# Patient Record
Sex: MALE | Race: WHITE | NOT HISPANIC OR LATINO | URBAN - METROPOLITAN AREA
[De-identification: names, ages, dates, MRNs, and addresses within clinical notes are randomized per-mention and may not be internally consistent; named-entity substitution may affect disease eponyms.]

---

## 2020-01-17 ENCOUNTER — TRANSCRIBE ORDERS (OUTPATIENT)
Dept: LAB | Facility: HOSPITAL | Age: 20
End: 2020-01-17

## 2020-01-17 ENCOUNTER — APPOINTMENT (OUTPATIENT)
Dept: LAB | Facility: HOSPITAL | Age: 20
End: 2020-01-17

## 2020-01-17 DIAGNOSIS — Z11.1 SCREENING EXAMINATION FOR PULMONARY TUBERCULOSIS: Primary | ICD-10-CM

## 2020-01-17 DIAGNOSIS — Z11.1 SCREENING EXAMINATION FOR PULMONARY TUBERCULOSIS: ICD-10-CM

## 2020-01-17 PROCEDURE — 86480 TB TEST CELL IMMUN MEASURE: CPT

## 2020-01-17 PROCEDURE — 36415 COLL VENOUS BLD VENIPUNCTURE: CPT

## 2020-01-20 LAB
GAMMA INTERFERON BACKGROUND BLD IA-ACNC: 0.02 IU/ML
M TB IFN-G BLD-IMP: NEGATIVE
M TB IFN-G CD4+ BCKGRND COR BLD-ACNC: 0 IU/ML
M TB IFN-G CD4+ BCKGRND COR BLD-ACNC: 0.01 IU/ML
MITOGEN IGNF BCKGRD COR BLD-ACNC: >10 IU/ML

## 2021-08-23 ENCOUNTER — OFFICE VISIT (OUTPATIENT)
Dept: URGENT CARE | Facility: CLINIC | Age: 21
End: 2021-08-23
Payer: COMMERCIAL

## 2021-08-23 ENCOUNTER — TELEPHONE (OUTPATIENT)
Dept: UROLOGY | Facility: MEDICAL CENTER | Age: 21
End: 2021-08-23

## 2021-08-23 VITALS
HEART RATE: 76 BPM | SYSTOLIC BLOOD PRESSURE: 121 MMHG | TEMPERATURE: 98.8 F | DIASTOLIC BLOOD PRESSURE: 76 MMHG | RESPIRATION RATE: 17 BRPM | OXYGEN SATURATION: 98 %

## 2021-08-23 DIAGNOSIS — N39.0 URINARY TRACT INFECTION WITHOUT HEMATURIA, SITE UNSPECIFIED: Primary | ICD-10-CM

## 2021-08-23 LAB
SL AMB  POCT GLUCOSE, UA: ABNORMAL
SL AMB LEUKOCYTE ESTERASE,UA: ABNORMAL
SL AMB POCT BILIRUBIN,UA: ABNORMAL
SL AMB POCT BLOOD,UA: ABNORMAL
SL AMB POCT CLARITY,UA: ABNORMAL
SL AMB POCT COLOR,UA: ABNORMAL
SL AMB POCT KETONES,UA: 40
SL AMB POCT NITRITE,UA: POSITIVE
SL AMB POCT PH,UA: 8.5
SL AMB POCT SPECIFIC GRAVITY,UA: 1.01
SL AMB POCT URINE PROTEIN: 30
SL AMB POCT UROBILINOGEN: 2

## 2021-08-23 PROCEDURE — 99213 OFFICE O/P EST LOW 20 MIN: CPT | Performed by: NURSE PRACTITIONER

## 2021-08-23 PROCEDURE — 81002 URINALYSIS NONAUTO W/O SCOPE: CPT | Performed by: NURSE PRACTITIONER

## 2021-08-23 PROCEDURE — 87186 SC STD MICRODIL/AGAR DIL: CPT | Performed by: NURSE PRACTITIONER

## 2021-08-23 PROCEDURE — 87077 CULTURE AEROBIC IDENTIFY: CPT | Performed by: NURSE PRACTITIONER

## 2021-08-23 PROCEDURE — 87086 URINE CULTURE/COLONY COUNT: CPT | Performed by: NURSE PRACTITIONER

## 2021-08-23 RX ORDER — FLUTICASONE PROPIONATE 50 MCG
2 SPRAY, SUSPENSION (ML) NASAL DAILY
COMMUNITY

## 2021-08-23 RX ORDER — CEPHALEXIN 500 MG/1
500 CAPSULE ORAL EVERY 6 HOURS SCHEDULED
Qty: 28 CAPSULE | Refills: 0 | Status: SHIPPED | OUTPATIENT
Start: 2021-08-23 | End: 2021-08-30

## 2021-08-23 RX ORDER — ASCORBIC ACID 500 MG
500 TABLET ORAL DAILY
COMMUNITY

## 2021-08-23 RX ORDER — OXYBUTYNIN CHLORIDE 15 MG/1
TABLET, EXTENDED RELEASE ORAL
COMMUNITY
Start: 2021-05-19 | End: 2021-10-15

## 2021-08-23 NOTE — TELEPHONE ENCOUNTER
Called and spoke with patients mother Kaylah Schultz  Scheduled patient for a new patient appointment in the [facility] on 9/24/2021 with Sandra Davis  Aware of office location

## 2021-08-23 NOTE — TELEPHONE ENCOUNTER
Please Triage - [place]   New Patient-     What is the reason for the patients appointment? Patient's mom called stating patient has a neurogenic bladder and he was treated at [facility] in [place]  Patient is now 21 and not able to see them anymore  She would like to have him follow up with [facility]        Imaging/Lab Results: all records in epic       Do we accept the patient's insurance or is the patient Self-Pay? [facility]   Provider & Plan:   Member ID#: Has the patient had any previous urologist(s)? Rufino Desai In [place]       Have patient records been requested?in epic        Has the patient had any outside testing done?       Does the patient have a personal history of cancer?no       Patient can be reached at :[phone] or   [phone]

## 2021-08-23 NOTE — PATIENT INSTRUCTIONS
Urinary Tract Infection in Men   WHAT YOU NEED TO KNOW:   A urinary tract infection (UTI) is caused by bacteria that get inside your urinary tract  Most bacteria that enter your urinary tract come out when you urinate  If the bacteria stay in your urinary tract, you may get an infection  Your urinary tract includes your kidneys, ureters, bladder, and urethra  Urine is made in your kidneys, and it flows from the ureters to the bladder  Urine leaves the bladder through the urethra  A UTI is more common in your lower urinary tract, which includes your bladder and urethra  DISCHARGE INSTRUCTIONS:   Return to the emergency department if:   · You are urinating very little or not at all  · You have a high fever with shaking chills  · You have side or back pain that gets worse  Contact your healthcare provider if:   · You have a mild fever  · You do not feel better after 2 days of taking antibiotics  · You are vomiting  · You have new symptoms, such as blood or pus in your urine  · You have questions or concerns about your condition or care  Medicines:   · Antibiotics  help fight a bacterial infection  · Medicines  may be given to decrease pain and burning when you urinate  They will also help decrease the feeling that you need to urinate often  These medicines will make your urine orange or red  · Take your medicine as directed  Contact your healthcare provider if you think your medicine is not helping or if you have side effects  Tell him of her if you are allergic to any medicine  Keep a list of the medicines, vitamins, and herbs you take  Include the amounts, and when and why you take them  Bring the list or the pill bottles to follow-up visits  Carry your medicine list with you in case of an emergency  Prevent another UTI:   · Empty your bladder often  Urinate and empty your bladder as soon as you feel the need  Do not hold your urine for long periods of time      · Drink liquids as directed  Ask how much liquid to drink each day and which liquids are best for you  You may need to drink more liquids than usual to help flush out the bacteria  Do not drink alcohol, caffeine, or citrus juices  These can irritate your bladder and increase your symptoms  Your healthcare provider may recommend cranberry juice to help prevent a UTI  · Urinate after you have sex  This can help flush out bacteria passed during sex  · Do pelvic muscle exercises often  Pelvic muscle exercises may help you start and stop urinating  Strong pelvic muscles may help you empty your bladder easier  Squeeze these muscles tightly for 5 seconds like you are trying to hold back urine  Then relax for 5 seconds  Gradually work up to squeezing for 10 seconds  Do 3 sets of 15 repetitions a day, or as directed  Follow up with your healthcare provider as directed:  Write down your questions so you remember to ask them during your visits  © [employer] 2021 Information is for End User's use only and may not be sold, redistributed or otherwise used for commercial purposes  All illustrations and images included in CareNotes® are the copyrighted property of A D A M , Inc  or [facility] Monica Zaldivar   The above information is an  only  It is not intended as medical advice for individual conditions or treatments  Talk to your doctor, nurse or pharmacist before following any medical regimen to see if it is safe and effective for you

## 2021-08-23 NOTE — PROGRESS NOTES
[address]        NAME: Jhoana Ignacio is a 21 y o  male  : 2000    MRN: [id number]  DATE: 2021  TIME: 6:49 PM    Assessment and Plan   Urinary tract infection without hematuria, site unspecified [N39 0]  1  Urinary tract infection without hematuria, site unspecified  POCT urine dip    Urine culture    cephalexin (KEFLEX) 500 mg capsule     UA for pos nit, blood, and leuks   Recent bactrim use   Start course of keflex   Urine sent for C&S -   Has appt with urology end of sept  Patient Instructions     Follow up with PCP in 3-5 days  Proceed to  ER if symptoms worsen  Chief Complaint     Chief Complaint   Patient presents with    Urinary Tract Infection     Patient started last night with possible uti symtoms  Patient started with fever 100 1         History of Present Illness   Jhoana Ignacio presents to the clinic c/o    Pt presents to the office for evaluation of fever -   Started with mildly elevated temp yesterday and some body aches -   Documented fever today  He has a h/o neurogenic bowel and bladder and self caths through a Mitrofanoff   Due to the mitrofanoff he does not have normal UTI symptoms - mom states when he starts leaking from the stoma is when he typically has a UTI - Noticed leaking starting yesterday and was worse over night  H/o catheter associated UTIs  Started with UTI symptoms a few weeks ago - mom had some left over bactrim at home from a previous infection so she started those  Symptoms resolved  Now returned  Review of Systems   Review of Systems   All other systems reviewed and are negative          Current Medications     Long-Term Medications   Medication Sig Dispense Refill    ascorbic acid (VITAMIN C) 500 MG tablet Take 500 mg by mouth daily      fluticasone (FLONASE) 50 mcg/act nasal spray 2 sprays into each nostril daily      oxybutynin (DITROPAN XL) 15 MG 24 hr tablet TAKE ONE TABLET BY MOUTH EVERY DAY         Current Allergies     Allergies as of 08/23/2021 - Reviewed 08/23/2021   Allergen Reaction Noted    Medical tape Other (See Comments) 07/09/2019    Pollen extract Sneezing 02/15/2006            The following portions of the patient's history were reviewed and updated as appropriate: allergies, current medications, past family history, past medical history, past social history, past surgical history and problem list     Objective   /76 (BP Location: Left arm, Patient Position: Sitting, Cuff Size: Adult)   Pulse 76   Temp 98 8 °F (37 1 °C) (Tympanic)   Resp 17   SpO2 98%        Physical Exam     Physical Exam  Vitals and nursing note reviewed  Constitutional:       Appearance: Normal appearance  He is well-developed  HENT:      Head: Normocephalic and atraumatic  Eyes:      General: Lids are normal       Conjunctiva/sclera: Conjunctivae normal       Pupils: Pupils are equal, round, and reactive to light  Cardiovascular:      Rate and Rhythm: Normal rate and regular rhythm  Pulses: Normal pulses  Heart sounds: Normal heart sounds, S1 normal and S2 normal    Pulmonary:      Effort: Pulmonary effort is normal       Breath sounds: Normal breath sounds  Abdominal:      Tenderness: There is no abdominal tenderness  There is no right CVA tenderness or left CVA tenderness  Skin:     General: Skin is warm and dry  Neurological:      Mental Status: He is alert  Psychiatric:         Mood and Affect: Mood normal          Speech: Speech normal          Behavior: Behavior normal          Thought Content:  Thought content normal          Judgment: Judgment normal

## 2021-08-26 LAB — BACTERIA UR CULT: ABNORMAL

## 2021-09-09 ENCOUNTER — TELEPHONE (OUTPATIENT)
Dept: UROLOGY | Facility: CLINIC | Age: 21
End: 2021-09-09

## 2021-09-09 NOTE — TELEPHONE ENCOUNTER
Patient establishing as a new patient visit  Has complex urologic history, neurogenic bladder with reconstruction as a child transferring from Pediatric Urology  His initial visit is probably best arranged with the physician to review  Records from [facility] are available in Care everywhere  Can we please look for a time to put this patient on my schedule instead of advanced practitioner?

## 2021-09-10 NOTE — TELEPHONE ENCOUNTER
Called and spoke with patients mother Denishajay Micheal  Appointment with Leni Ryan for 9/24 cancelled and patient scheduled for appointment with Dr Nery Bailey on 9/21/2021 @ 3:15pm  Patient's mother verbalized understanding and is aware of office location

## 2021-10-15 ENCOUNTER — OFFICE VISIT (OUTPATIENT)
Dept: UROLOGY | Facility: CLINIC | Age: 21
End: 2021-10-15
Payer: COMMERCIAL

## 2021-10-15 VITALS
HEART RATE: 62 BPM | SYSTOLIC BLOOD PRESSURE: 120 MMHG | HEIGHT: 66 IN | BODY MASS INDEX: 22.18 KG/M2 | DIASTOLIC BLOOD PRESSURE: 82 MMHG | WEIGHT: 138 LBS

## 2021-10-15 DIAGNOSIS — N31.9 NEUROGENIC BLADDER: ICD-10-CM

## 2021-10-15 DIAGNOSIS — K59.2 NEUROGENIC BOWEL: Primary | ICD-10-CM

## 2021-10-15 PROCEDURE — 99205 OFFICE O/P NEW HI 60 MIN: CPT | Performed by: UROLOGY

## 2021-10-18 ENCOUNTER — HOSPITAL ENCOUNTER (OUTPATIENT)
Dept: ULTRASOUND IMAGING | Facility: MEDICAL CENTER | Age: 21
Discharge: HOME | End: 2021-10-18
Payer: COMMERCIAL

## 2021-10-18 ENCOUNTER — APPOINTMENT (OUTPATIENT)
Dept: LAB | Facility: MEDICAL CENTER | Age: 21
End: 2021-10-18
Payer: COMMERCIAL

## 2021-10-18 DIAGNOSIS — K59.2 NEUROGENIC BOWEL: ICD-10-CM

## 2021-10-18 LAB
ANION GAP SERPL CALCULATED.3IONS-SCNC: 5 MMOL/L (ref 4–13)
BUN SERPL-MCNC: 19 MG/DL (ref 5–25)
CALCIUM SERPL-MCNC: 9.9 MG/DL (ref 8.3–10.1)
CHLORIDE SERPL-SCNC: 105 MMOL/L (ref 100–108)
CO2 SERPL-SCNC: 26 MMOL/L (ref 21–32)
CREAT SERPL-MCNC: 1.01 MG/DL (ref 0.6–1.3)
GFR SERPL CREATININE-BSD FRML MDRD: 106 ML/MIN/1.73SQ M
GLUCOSE P FAST SERPL-MCNC: 91 MG/DL (ref 65–99)
POTASSIUM SERPL-SCNC: 4.1 MMOL/L (ref 3.5–5.3)
SODIUM SERPL-SCNC: 136 MMOL/L (ref 136–145)

## 2021-10-18 PROCEDURE — 80048 BASIC METABOLIC PNL TOTAL CA: CPT

## 2021-10-18 PROCEDURE — 76770 US EXAM ABDO BACK WALL COMP: CPT

## 2021-10-18 PROCEDURE — 36415 COLL VENOUS BLD VENIPUNCTURE: CPT

## 2022-02-01 ENCOUNTER — NURSE TRIAGE (OUTPATIENT)
Dept: OTHER | Facility: OTHER | Age: 22
End: 2022-02-01

## 2022-02-01 ENCOUNTER — APPOINTMENT (OUTPATIENT)
Dept: LAB | Facility: MEDICAL CENTER | Age: 22
End: 2022-02-01
Payer: COMMERCIAL

## 2022-02-01 DIAGNOSIS — N30.00 ACUTE CYSTITIS WITHOUT HEMATURIA: Primary | ICD-10-CM

## 2022-02-01 DIAGNOSIS — N30.00 ACUTE CYSTITIS WITHOUT HEMATURIA: ICD-10-CM

## 2022-02-01 PROCEDURE — 87186 SC STD MICRODIL/AGAR DIL: CPT

## 2022-02-01 PROCEDURE — 87086 URINE CULTURE/COLONY COUNT: CPT

## 2022-02-01 PROCEDURE — 87077 CULTURE AEROBIC IDENTIFY: CPT

## 2022-02-01 RX ORDER — CEFUROXIME AXETIL 250 MG/1
250 TABLET ORAL EVERY 12 HOURS SCHEDULED
Qty: 10 TABLET | Refills: 0 | Status: SHIPPED | OUTPATIENT
Start: 2022-02-01 | End: 2022-02-06

## 2022-02-01 NOTE — TELEPHONE ENCOUNTER
Returned call to patients mother  Advised of providers recommendation  Patient will get urine testing and then start medication prescribed by provider  Patients mother was questioning whether he can be excused from jury duty  Advised patient mother to contact court to see what policies are   She can contact our office if they would require a note

## 2022-02-01 NOTE — TELEPHONE ENCOUNTER
Would obtain urine culture  Once culture obtained, I am sending a prescription for Ceftin to be taken twice a day for 5 days    Should not start until urine obtained

## 2022-02-01 NOTE — TELEPHONE ENCOUNTER
Reason for Disposition   Side (flank) or lower back pain present    Answer Assessment - Initial Assessment Questions  1  SYMPTOM: "What's the main symptom you're concerned about?" (e g , frequency, incontinence)      Mother and [facility] on phone for this conversation  Mom on communication consent  He is having bilateral lower flank pain (today only left lower flank) , fever (101 highest yesterday)  Thermometer not working today  He suspects he has a UTI  2  ONSET: "When did the  symptoms  start?"      1/27/22 but did not tell his mother until 1/31/22  Mom called [facility] today  3  PAIN: "Is there any pain?" If Yes, ask: "How bad is it?" (Scale: 1-10; mild, moderate, severe)      Rates flank pain 6/10  Tylenol taken yesterday with slight relief  No other alleviating or aggravating factors  4  CAUSE: "What do you think is causing the symptoms?"      UTI    5  OTHER SYMPTOMS: "Do you have any other symptoms?" (e g , fever, flank pain, blood in urine, pain with urination)      Fever, leaking urine from stoma (which only occurs with an infection)  6  PREGNANCY: "Is there any chance you are pregnant?" "When was your last menstrual period?"      N/a    Negative covid rapid test today  He is also scheduled for jury duty on 1/3/22  He is not established with a PCP currently  Just recently switched from his pediatrician  Protocols used: URINARY SYMPTOMS-ADULT-OH      SEE IN OFFICE TODAY:   * You need to be examined today  Message sent to office triage and marked high priority  Return call if no contact from office or worsening of symptoms

## 2022-02-01 NOTE — TELEPHONE ENCOUNTER
Regarding: Possible UTI / Urology  ----- Message from Olinda Hansen sent at 2/1/2022 10:19 AM EST -----  "My son hasn't been feeling well since last Thursday  He wet the bed last night, has a low grade fever, and his sides hurt  I believe he may have a UTI   Can someone prescribe something for him "

## 2022-02-03 LAB — BACTERIA UR CULT: ABNORMAL

## 2022-10-21 ENCOUNTER — TELEPHONE (OUTPATIENT)
Dept: UROLOGY | Facility: CLINIC | Age: 22
End: 2022-10-21

## 2022-12-01 ENCOUNTER — OFFICE VISIT (OUTPATIENT)
Dept: UROLOGY | Facility: CLINIC | Age: 22
End: 2022-12-01

## 2022-12-01 VITALS
SYSTOLIC BLOOD PRESSURE: 110 MMHG | WEIGHT: 136 LBS | BODY MASS INDEX: 21.95 KG/M2 | DIASTOLIC BLOOD PRESSURE: 64 MMHG | HEART RATE: 70 BPM

## 2022-12-01 DIAGNOSIS — N31.9 NEUROGENIC BLADDER: Primary | ICD-10-CM

## 2022-12-01 DIAGNOSIS — K59.2 NEUROGENIC BOWEL: ICD-10-CM

## 2022-12-01 NOTE — PROGRESS NOTES
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, prior subdural hemorrhage, neurogenic bladder  Patient underwent a history of split appendiceal catheterizable channel or Mitrofanoff and then ACE Dominguez procedure (2012), patient had a reoperative bladder neck repair and sling in 2017 and a Deflux injection in 2019  Patient has had recurrent leakage through his urethra and intermittent infections  Last visit at the [facility] where he previously received care was in 2019  Last imaging around that time  Presented in 2021 as a transfer of care  His family reports that it was recommended to catheterize 4 times a day with a 12 Khmer catheter  He is often currently catheterizing 3 times a day  He reports he is not having any difficulty  He has a good clean intermittent catheterization routine with hand cleaning  He utilized his ACE daily with 2 cups of sterile saline with occasional glycerin for bowel mgmt  Since initial visit in 2021, one infection 2/2022 with e coli  Patient began a job at [employer] in September  Start his job at 11:30 a m  after waking at 10:00 a m     Works until 8:00 p m  He reports that since this time, he is not been catheterizing at all and has been draining urine per urethra  He is had no infections or pain during this time  He has continued his enemas  No leakage around his channels  Past Medical History:   No past medical history on file  PAST SURGICAL HISTORY:   No past surgical history on file      CURRENT MEDICATIONS:     Current Outpatient Medications   Medication Sig Dispense Refill   • ascorbic acid (VITAMIN C) 500 MG tablet Take 500 mg by mouth daily     • Cranberry 1000 MG CAPS Use     • fluticasone (FLONASE) 50 mcg/act nasal spray 2 sprays into each nostril daily     • Omega-3 Fatty Acids (OMEGA 3 PO) Take by mouth       No current facility-administered medications for this visit  ALLERGIES:     Allergies   Allergen Reactions   • Medical Tape Other (See Comments)     redness   • Pollen Extract Sneezing     SCM Entered Allergen= Season Changes; SCM description= Nasal congestion  SOCIAL HISTORY:     Social History     Socioeconomic History   • Marital status: Unknown     Spouse name: Not on file   • Number of children: Not on file   • Years of education: Not on file   • Highest education level: Not on file   Occupational History   • Not on file   Tobacco Use   • Smoking status: Not on file   • Smokeless tobacco: Not on file   Substance and Sexual Activity   • Alcohol use: Not on file   • Drug use: Not on file   • Sexual activity: Not on file   Other Topics Concern   • Not on file   Social History Narrative   • Not on file     Social Determinants of Health     Financial Resource Strain: Not on file   Food Insecurity: Not on file   Transportation Needs: Not on file   Physical Activity: Not on file   Stress: Not on file   Social Connections: Not on file   Intimate Partner Violence: Not on file   Housing Stability: Not on file       SOCIAL HISTORY:   No family history on file  REVIEW OF SYSTEMS:     Review of Systems   Constitutional: Negative  Respiratory: Negative  Cardiovascular: Negative  Gastrointestinal: Negative  Genitourinary: Negative  Musculoskeletal: Negative  Skin: Negative  Psychiatric/Behavioral: Negative  PHYSICAL EXAM:     There were no vitals taken for this visit  General:  Healthy appearing male in no acute distress  They have a normal affect  There is not appear to be any gross neurologic defects or abnormalities  HEENT:  Normocephalic, atraumatic    Neck is supple without any palpable lymphadenopathy  Cardiovascular:  Patient has normal palpable distal radial pulses  There is no significant peripheral edema  No JVD is noted  Respiratory:  Patient has unlabored respirations  There is no audible wheeze or rhonchi  Abdomen:  Abdomen is with healed surgical scars  Right lower quadrant pinpoint stoma for his Mitrofanoff, umbilical stoma for his ACE  Abdomen is soft and nontender  There is no tympany  Inguinal and umbilical hernia are not appreciated  Musculoskeletal:  Patient does not have significant CVA tenderness in the  flank with palpation or percussion  They full range of motion in all 4 extremities  Strength in all 4 extremities appears congruent  Patient is able to ambulate without assistance or difficulty  Dermatologic:  Patient has no skin abnormalities or rashes        LABS:     CBC: No results found for: WBC, HGB, HCT, MCV, PLT    BMP:   Lab Results   Component Value Date    CALCIUM 9 9 10/18/2021    K 4 1 10/18/2021    CO2 26 10/18/2021     10/18/2021    BUN 19 10/18/2021    CREATININE 1 01 10/18/2021     Urine Culture >100,000 cfu/ml Escherichia coli Abnormal             Susceptibility     Escherichia coli     JOLYNN     Amoxicillin + Clavulanate <=8/4 ug/ml Susceptible     Ampicillin ($$) >16 00 ug/ml Resistant     Ampicillin + Sulbactam ($) 16/8 ug/ml Intermediate     Aztreonam ($$$)  <=4 ug/ml Susceptible     Cefazolin ($) <=2 00 ug/ml Susceptible     Ciprofloxacin ($)  <=0 25 ug/ml Susceptible     Ertapenem ($$$) <=0 5 ug/ml Susceptible     Gentamicin ($$) <=2 ug/ml Susceptible     Levofloxacin ($) <=0 50 ug/ml Susceptible     Minocycline <=4 ug/ml Susceptible     Nitrofurantoin <=32 ug/ml Susceptible     Tetracycline >8 ug/ml Resistant     Tobramycin ($) <=2 ug/ml Susceptible     Trimethoprim + Sulfamethoxazole ($$$) >2/38 ug/ml Resistant     ZID Performed Yes                    Specimen Collected: 02/01/22  1:20 PM             IMAGING:     10/18/21  RENAL ULTRASOUND     INDICATION:   K59 2: Neurogenic bowel, not elsewhere classified      COMPARISON: 10/30/2006     TECHNIQUE:   Ultrasound of the retroperitoneum was performed with a curvilinear transducer utilizing volumetric sweeps and still imaging techniques       FINDINGS:     KIDNEYS:  There is asymmetric left renal atrophy with measurements below  Right kidney:  11 5 x 4 1 x 5 3 cm  Left kidney:  8 4 x 4 1 x 4 1 cm      Right kidney  Normal echogenicity and contour  No suspicious masses detected  No hydronephrosis  There is stable fluid in a right upper pole calyx or peripelvic cyst   No shadowing calculi  No perinephric fluid collections      Left kidney  There is cortical scarring  Echogenicity is normal   No suspicious masses detected  No hydronephrosis  No shadowing calculi  No perinephric fluid collections      URETERS:  Nonvisualized      BLADDER:   Normally distended  No focal thickening or mass lesions  Bilateral ureteral jets detected  There is bladder debris      IMPRESSION:     1  No hydronephrosis      2  Focally dilated right upper pole calyx versus peripelvic cyst, stable from 2006      3   Left renal atrophy and cortical scarring with normal echogenicity        4   Bladder debris likely related to self catheterization  ASSESSMENT:     25 y o  male with longstanding neurogenic bladder, complex urologic repair    PLAN:     I am of course concerned about the patient's lack of catheterization  We discussed that potentially he is voiding via overflow and high pressured incontinence  I have discussed with the patient and his father concerns about long-term incomplete emptying, high pressure voiding and ultimately transmission of high pressures to the kidneys with renal failure  I have recommended that this weekend when the patient is off from work, he calculate his voided volume per urethra and then perform a catheterized postvoid residual with volumes as well  This will at least let me assess the patient's current voiding status      He absolutely needs an updated ultrasound and BMP to ensure he is not developed deterioration during this period of lack of catheterizations  If there are concerns, would strongly consider urodynamics and cystoscopy  I have given some recommendations for a catheterization plan for the patient which include single catheterization before he goes to work, taking a catheter to work for potentially 1 bladder drainage during his 8 hour shift and a 3rd catheterization when he returns home before bed  We discussed the importance of regular bladder cycling and emptying  The family understands that should he develop more complex concerns, I would likely consider referral to a neuro urologist in [place] who would have access to more complex testing and treatments including video urodynamics or reconstruction in the adult setting

## 2022-12-23 ENCOUNTER — HOSPITAL ENCOUNTER (OUTPATIENT)
Dept: ULTRASOUND IMAGING | Facility: MEDICAL CENTER | Age: 22
Discharge: HOME | End: 2022-12-23

## 2022-12-23 DIAGNOSIS — N31.9 NEUROGENIC BLADDER: ICD-10-CM

## 2022-12-23 DIAGNOSIS — K59.2 NEUROGENIC BOWEL: ICD-10-CM

## 2023-01-18 ENCOUNTER — TELEPHONE (OUTPATIENT)
Dept: OTHER | Facility: OTHER | Age: 23
End: 2023-01-18

## 2023-01-18 NOTE — TELEPHONE ENCOUNTER
Patient's mother calling in stating they received a letter to call urology to discuss results of ultrasound and next steps   Mother requesting a call back at this time, please follow up  m 21-Jan-2017 06:57

## 2023-01-19 NOTE — TELEPHONE ENCOUNTER
Called to clarify imaging but I do not see a result note on US please review and advise and I will call pt's mother back

## 2023-01-20 NOTE — TELEPHONE ENCOUNTER
His ultrasound is stable and unchanged from the one previously completed  It demonstrates bladder debris which is likely secondary to his catheterization, lack of emptying his bladder  He has some scarring in his 1 kidney likely from not emptying his bladder well  But it appears stable at this time    He should repeat his ultrasound as scheduled prior to his next appointment in June

## 2023-01-20 NOTE — TELEPHONE ENCOUNTER
Contacted patients mother Lexy Cramer and left VM with Romayne Listen, CHELSIE's note regarding patients US results and plan for repeat US as scheduled prior to upcoming appointment with Dr Analilia Wilcox  Asked her to call back with any questions or concerns

## 2023-02-08 ENCOUNTER — APPOINTMENT (OUTPATIENT)
Dept: LAB | Facility: CLINIC | Age: 23
End: 2023-02-08

## 2023-02-08 ENCOUNTER — TELEPHONE (OUTPATIENT)
Dept: UROLOGY | Facility: AMBULATORY SURGERY CENTER | Age: 23
End: 2023-02-08

## 2023-02-08 DIAGNOSIS — N31.9 NEUROGENIC BLADDER: Primary | ICD-10-CM

## 2023-02-08 DIAGNOSIS — R39.9 UTI SYMPTOMS: ICD-10-CM

## 2023-02-08 LAB
BACTERIA UR QL AUTO: ABNORMAL /HPF
BILIRUB UR QL STRIP: NEGATIVE
CLARITY UR: CLEAR
COLOR UR: YELLOW
GLUCOSE UR STRIP-MCNC: NEGATIVE MG/DL
HGB UR QL STRIP.AUTO: NEGATIVE
KETONES UR STRIP-MCNC: ABNORMAL MG/DL
LEUKOCYTE ESTERASE UR QL STRIP: ABNORMAL
MUCOUS THREADS UR QL AUTO: ABNORMAL
NITRITE UR QL STRIP: POSITIVE
NON-SQ EPI CELLS URNS QL MICRO: ABNORMAL /HPF
PH UR STRIP.AUTO: 7.5 [PH]
PROT UR STRIP-MCNC: ABNORMAL MG/DL
RBC #/AREA URNS AUTO: ABNORMAL /HPF
SP GR UR STRIP.AUTO: 1.02 (ref 1–1.03)
UROBILINOGEN UR STRIP-ACNC: <2 MG/DL
WBC #/AREA URNS AUTO: ABNORMAL /HPF

## 2023-02-08 RX ORDER — CEPHALEXIN 500 MG/1
500 CAPSULE ORAL EVERY 8 HOURS SCHEDULED
Qty: 21 CAPSULE | Refills: 0 | Status: SHIPPED | OUTPATIENT
Start: 2023-02-08 | End: 2023-02-15

## 2023-02-08 NOTE — TELEPHONE ENCOUNTER
Returned call to patients mother Shane Romero  She states patient was fine yesterday- he ate and drank well and had no complaints  A little after midnight last night he had an episode of emesis and a low grade fever  He stated he was hot and cold all night  This morning patient is quiet which mother states happens when he is not feeling well  He is also having some leakage from his stoma  He is afebrile this morning, however urine dip at home showed ketones and urine is very dark in color with a foul odor  Mother reports patient is hydrating well, however not eating much  She reports not being involved in the CIC process any more as patient performs this on his own, however she thinks patient is performing CIC about 3 times/day  Orders placed for urine testing and ER precautions reviewed for uncontrolled fevers, chills, etc  Mother asking if antibiotic can be called into [facility] in [facility] for patient to start taking after urine sample is left today  Will forward to provider regarding antibiotic and any additional recommendations

## 2023-02-08 NOTE — TELEPHONE ENCOUNTER
Patient under the care of Dr Mueller Raw    Reason for call: mom concerned about his kidneys  She's a little freaked out because she lost a son already and was teary while on the phone with me  She said he doesn't usually complain of anything which makes her worry even more  Patient symptoms are: Pain on sides and waist  Low grade fever, darker than usual urine  Mom dipped his urine at home which showed high ketones  Symptoms started last night with chills and vomiting      Mom can be reached on her cell at [phone]

## 2023-02-08 NOTE — TELEPHONE ENCOUNTER
Please let patient know that since his preliminary testing is coming back positive Keflex was sent to his pharmacy based off of prior cultures    If we need to adjust the antibiotic we will notify

## 2023-02-08 NOTE — TELEPHONE ENCOUNTER
Returned call to patients mother Cha Farias  Advised of patients urinalysis coming back positive so Keflex was sent to the pharmacy  Aware office will contact her with final culture results if patient needs to switch antibiotics

## 2023-02-10 LAB — BACTERIA UR CULT: ABNORMAL

## 2023-02-13 NOTE — TELEPHONE ENCOUNTER
Spoke with patients mother Mandi Jensen and advised for patient to complete full course of Keflex as ordered based on final urine culture results  Mandi Jensen reports patient is doing much better  Subjective:       Patient ID: Maria Elena Tavares is a 64 y.o. female.    Chief Complaint: No chief complaint on file.    HPI     Reports   Mouth sores  No nausea or vomiting  No fevers or chills  + fatigue and some shortness of breath on exertion  No chest pain    HPI Patient presents for follow up and cycle 1 of weekly taxol     Oncology History:  - self detected, noted a shooting pain in breast first and then a week later felt a lump  Some delay in getting appointment as she was unaware she had to call  - 8/30/19 Diagnostic mammogram and ultrasound:  Left breast 20 mm x 18 mm x 17 mm mass at the 3 o'clock position. Assessment: 4 - Suspicious finding. Biopsy is recommended.   Lymph Node: Left axilla 16 mm x 14 mm x 13 mm lymph node. Assessment: 4 - Suspicious finding. Biopsy is recommended.   Right- There is no mammographic or sonographic evidence of malignancy.  BI-RADS Category:   Overall: 4 - Suspicious  - 9/5/19 Ultrasound guided biopsy  Pathology:  FINAL PATHOLOGIC DIAGNOSIS  1. LEFT BREAST MASS, BIOPSY:  - Invasive ductal carcinoma, grade 3, longest linear length is 10 mm measured on the slide.  - Histologic Grade (Elmer Histologic Score)  Glandular (Acinar/Tubular Differentiation): 3.  Nuclear Pleomorphism: 2.  Mitotic Rate: 3.  Overall Grade: Grade 3 (score 8).  - Microcalcifications: Not identified.  - Lymphovascular invasion: Not identified.  2. LYMPH NODE, LEFT AXILLA, BIOPSY:  - Positive for metastatic carcinoma.  - The metastatic deposit measures 6 mm in longest continuous linear length.  Estrogen receptor: Positive, 90% of the tumor nuclei staining moderate to strongly.  Progesterone receptor: Positive, 30% of the tumor nuclei staining weak to moderately.  HER2: Negative.  Ki-67: 60%.  - ECHO 9/20/19: EF 64%  - PET 9/21/19:  Impression         Hypermetabolic left breast mass and regional left axillary lymphadenopathy consistent with reported breast cancer and corresponding with recent MRI  findings.    Upper limit of normal sized right axillary lymph nodes with normal fatty duane and mildly increased radiotracer uptake.  Findings could represent reactive nodes with metastatic nodes thought less likely.  Lymphscintigraphy with injection in the left breast may considered to assess for drainage to the contralateral axilla.      BX 19: Right axilla node biopsy is benign     Gyn Hx:  Menarche- 11  Menopause - partial hysterectomy at age 36 yo  Remainder done in   No HRT  Prior ocps  , age 28 at 1st pregnancy  No breastfeeding      Review of Systems   Constitutional: Positive for fatigue. Negative for activity change, appetite change and unexpected weight change.   HENT: Positive for mouth sores. Negative for congestion, dental problem and trouble swallowing.         Taste change noted   Eyes: Negative for visual disturbance.   Respiratory: Positive for shortness of breath. Negative for cough and wheezing.    Cardiovascular: Negative for chest pain, palpitations and leg swelling.   Gastrointestinal: Negative for abdominal pain, diarrhea, nausea and vomiting.   Genitourinary: Negative for decreased urine volume, difficulty urinating and frequency.   Musculoskeletal: Negative for arthralgias, back pain and myalgias.   Skin: Negative for rash.   Neurological: Negative for dizziness, weakness, light-headedness, numbness and headaches.   Hematological: Negative for adenopathy. Does not bruise/bleed easily.   Psychiatric/Behavioral: Negative for dysphoric mood. The patient is not nervous/anxious.        Objective:      Physical Exam   Constitutional: She is oriented to person, place, and time. She appears well-developed and well-nourished. No distress.   ECOG=0  Presents alone   HENT:   Head: Normocephalic and atraumatic.   Mouth/Throat: Oropharynx is clear and moist. No oropharyngeal exudate.   Darkening of tongue noted     Eyes: Pupils are equal, round, and reactive to light. Conjunctivae and EOM  are normal. No scleral icterus. Right pupil is round and reactive. Left pupil is round and reactive.   Neck: Normal range of motion. Neck supple. No tracheal deviation present. No thyromegaly present.   Cardiovascular: Normal rate, regular rhythm and normal heart sounds. Exam reveals no gallop and no friction rub.   No murmur heard.  Pulmonary/Chest: Effort normal and breath sounds normal. No respiratory distress. She has no wheezes. She has no rales.   No palpable LAD.    Abdominal: Soft. Bowel sounds are normal. She exhibits no distension and no mass. There is no hepatosplenomegaly. There is no tenderness. There is no rebound and no guarding.   No hepatosplenomegaly   Musculoskeletal: Normal range of motion. She exhibits no edema, tenderness or deformity.   Lymphadenopathy:        Head (right side): No submandibular adenopathy present.        Head (left side): No submandibular adenopathy present.     She has no cervical adenopathy.        Right cervical: No superficial cervical, no deep cervical and no posterior cervical adenopathy present.       Left cervical: No superficial cervical, no deep cervical and no posterior cervical adenopathy present.        Right: No supraclavicular adenopathy present.        Left: No supraclavicular adenopathy present.   Neurological: She is alert and oriented to person, place, and time. She has normal strength and normal reflexes. No sensory deficit. Coordination normal.   Skin: Skin is warm and dry. No bruising, no lesion, no petechiae and no rash noted. She is not diaphoretic. No erythema. No pallor.   Psychiatric: She has a normal mood and affect. Her behavior is normal. Judgment and thought content normal. Her mood appears not anxious. She does not exhibit a depressed mood.   Nursing note and vitals reviewed.   Labs- reviewed   Assessment:       1. Chemotherapy-induced nausea    2. Breast cancer metastasized to axillary lymph node, left    3. Anemia in neoplastic disease    4.  Mouth sores        Plan:     1. Medications refilled  2. Proceed with cycle # 1 Taxol consented today  Potential side effects reviewed  3. Type and screen next visit  Discussed possible transfusion  4. Mouthwash sent    25 minutes total

## 2023-05-05 ENCOUNTER — NURSE TRIAGE (OUTPATIENT)
Dept: OTHER | Facility: OTHER | Age: 23
End: 2023-05-05

## 2023-05-05 ENCOUNTER — APPOINTMENT (OUTPATIENT)
Dept: LAB | Facility: CLINIC | Age: 23
End: 2023-05-05

## 2023-05-05 DIAGNOSIS — N30.00 ACUTE CYSTITIS WITHOUT HEMATURIA: ICD-10-CM

## 2023-05-05 DIAGNOSIS — R39.89 SUSPECTED UTI: ICD-10-CM

## 2023-05-05 DIAGNOSIS — R39.89 SUSPECTED UTI: Primary | ICD-10-CM

## 2023-05-05 DIAGNOSIS — K59.2 NEUROGENIC BOWEL: ICD-10-CM

## 2023-05-05 DIAGNOSIS — N31.9 NEUROGENIC BLADDER: ICD-10-CM

## 2023-05-05 LAB
ANION GAP SERPL CALCULATED.3IONS-SCNC: 4 MMOL/L (ref 4–13)
BACTERIA UR QL AUTO: ABNORMAL /HPF
BILIRUB UR QL STRIP: NEGATIVE
BUN SERPL-MCNC: 19 MG/DL (ref 5–25)
CALCIUM SERPL-MCNC: 9 MG/DL (ref 8.3–10.1)
CHLORIDE SERPL-SCNC: 104 MMOL/L (ref 96–108)
CLARITY UR: ABNORMAL
CO2 SERPL-SCNC: 26 MMOL/L (ref 21–32)
COLOR UR: YELLOW
CREAT SERPL-MCNC: 1.05 MG/DL (ref 0.6–1.3)
GFR SERPL CREATININE-BSD FRML MDRD: 100 ML/MIN/1.73SQ M
GLUCOSE P FAST SERPL-MCNC: 83 MG/DL (ref 65–99)
GLUCOSE UR STRIP-MCNC: NEGATIVE MG/DL
HGB UR QL STRIP.AUTO: NEGATIVE
KETONES UR STRIP-MCNC: ABNORMAL MG/DL
LEUKOCYTE ESTERASE UR QL STRIP: ABNORMAL
NITRITE UR QL STRIP: POSITIVE
NON-SQ EPI CELLS URNS QL MICRO: ABNORMAL /HPF
PH UR STRIP.AUTO: 6.5 [PH]
POTASSIUM SERPL-SCNC: 3.8 MMOL/L (ref 3.5–5.3)
PROT UR STRIP-MCNC: ABNORMAL MG/DL
RBC #/AREA URNS AUTO: ABNORMAL /HPF
SODIUM SERPL-SCNC: 134 MMOL/L (ref 135–147)
SP GR UR STRIP.AUTO: 1.01 (ref 1–1.03)
UROBILINOGEN UR STRIP-ACNC: <2 MG/DL
WBC #/AREA URNS AUTO: ABNORMAL /HPF

## 2023-05-05 RX ORDER — CEPHALEXIN 500 MG/1
500 CAPSULE ORAL EVERY 6 HOURS SCHEDULED
Qty: 28 CAPSULE | Refills: 0 | Status: SHIPPED | OUTPATIENT
Start: 2023-05-05 | End: 2023-05-05 | Stop reason: SDUPTHER

## 2023-05-05 RX ORDER — CEPHALEXIN 500 MG/1
500 CAPSULE ORAL EVERY 6 HOURS SCHEDULED
Qty: 28 CAPSULE | Refills: 0 | Status: SHIPPED | OUTPATIENT
Start: 2023-05-05 | End: 2023-05-12

## 2023-05-05 NOTE — TELEPHONE ENCOUNTER
Returned call to patients mother Mai Born and advised that Keflex was sent to [facility] for patient to take 4x's/day x's 7 days  Advised office will call next week after final culture is available if antibiotic needs to be adjusted

## 2023-05-05 NOTE — TELEPHONE ENCOUNTER
"Reason for Disposition   Side (flank) or lower back pain present    Answer Assessment - Initial Assessment Questions  1  SYMPTOM: \"What's the main symptom you're concerned about? \" (e g , frequency, incontinence)      Foul odor    2  ONSET: \"When did the  s/s  start? \"      Yesterday afternoon    3  PAIN: \"Is there any pain? \" If Yes, ask: \"How bad is it? \" (Scale: 1-10; mild, moderate, severe)      Notes flank pain 7/10- tylenol provided slight relief    4  CAUSE: \"What do you think is causing the symptoms? \"      UTI    5  OTHER SYMPTOMS: \"Do you have any other symptoms? \" (e g , fever, flank pain, blood in urine, pain with urination)      Afebrile, leaking from mitrofanoff- usually caths 3-4 times    Protocols used: URINARY SYMPTOMS-ADULT-OH      "

## 2023-05-05 NOTE — TELEPHONE ENCOUNTER
"Pt noted increase fatigue and decreased appetite yesterday in addition to foul smelling urine and flank pain  Denies fever  [facility], does self cath 3-4 times per day  Expressed feeling his \"heart racing\"- mom checked hr, both times in 90's  Urine specimens placed  Please contact pt's mom for further instructions        "

## 2023-05-05 NOTE — TELEPHONE ENCOUNTER
Regarding: Symtoms of UTI with kidney pain and HR 92  ----- Message from Scott Holly sent at 5/5/2023  9:27 AM EDT -----  I think my son may have UTI and he is not eating and seems tired and complaining of kidney pain both sides  He has Stoma has some wetness there as well  He did complain that his heart was raising last night  His heart rate is 92  His blood  pressure is 119/78

## 2023-05-05 NOTE — TELEPHONE ENCOUNTER
Due to upcoming weekend and patient's symptoms I have sent Keflex to patient's pharmacy  Urine testing is currently in process and we will contact them when these results are final   We did review ER precautions as well

## 2023-05-07 LAB — BACTERIA UR CULT: ABNORMAL

## 2023-05-08 ENCOUNTER — TELEPHONE (OUTPATIENT)
Dept: UROLOGY | Facility: CLINIC | Age: 23
End: 2023-05-08

## 2023-05-08 NOTE — TELEPHONE ENCOUNTER
----- Message from Gemini Louis MD sent at 5/8/2023  2:04 PM EDT -----  Keflex was sent and culture is susceptible   Please make sure patient is improved

## 2023-05-26 ENCOUNTER — HOSPITAL ENCOUNTER (OUTPATIENT)
Dept: ULTRASOUND IMAGING | Facility: MEDICAL CENTER | Age: 23
Discharge: HOME | End: 2023-05-26

## 2023-05-26 DIAGNOSIS — K59.2 NEUROGENIC BOWEL: ICD-10-CM

## 2023-05-26 DIAGNOSIS — N31.9 NEUROGENIC BLADDER: ICD-10-CM

## 2023-06-15 ENCOUNTER — OFFICE VISIT (OUTPATIENT)
Dept: UROLOGY | Facility: CLINIC | Age: 23
End: 2023-06-15
Payer: COMMERCIAL

## 2023-06-15 VITALS
WEIGHT: 138 LBS | HEART RATE: 68 BPM | SYSTOLIC BLOOD PRESSURE: 110 MMHG | BODY MASS INDEX: 22.27 KG/M2 | DIASTOLIC BLOOD PRESSURE: 70 MMHG

## 2023-06-15 DIAGNOSIS — N31.9 NEUROGENIC BLADDER: Primary | ICD-10-CM

## 2023-06-15 PROCEDURE — 99214 OFFICE O/P EST MOD 30 MIN: CPT | Performed by: UROLOGY

## 2023-06-15 NOTE — PROGRESS NOTES
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 where he previously received care was in 2019  Last imaging around that time  Presented in 2021 as a transfer of care  His family reports that it was recommended to catheterize 4 times a day with a 12 Yi catheter  He is often currently catheterizing 3 times a day  He reports he is not having any difficulty  He has a good clean intermittent catheterization routine with hand cleaning  He utilized his ACE daily with 2 cups of sterile saline with occasional glycerin for bowel mgmt  Since initial visit in 2021, one infection 2/2022 with e coli  Since his last visit, the patient has continued to have difficulty with catheterization regimen  He was able to provide me with a voiding diary with postoperative catheter volumes  This was performed during a weekend when the patient was not working  Both he and his mother report that he has not been diligent during days where he is not performing the voiding diary and is rare to use catheters  Catheter diary indicates that most of the time and the patient is performing 4 times daily or 3 times daily postvoid catheters, he has at least 50% of the voided volume remaining in his bladder    Of note, the patient has had 2 urinary infections in recent history, one in February and 1 in May  Past Medical History:   History reviewed  No pertinent past medical history  PAST SURGICAL HISTORY:     Past Surgical History:   Procedure Laterality Date   • FL VCUG VOIDING URETHROCYSTOGRAM  7/22/2019   • FL VCUG VOIDING URETHROCYSTOGRAM  3/27/2017   • FL VCUG VOIDING URETHROCYSTOGRAM  10/10/2016   • FL VCUG VOIDING URETHROCYSTOGRAM  6/30/2014   • FL VCUG VOIDING URETHROCYSTOGRAM  9/30/2013   • FL VCUG VOIDING URETHROCYSTOGRAM  1/14/2013   • FL VCUG VOIDING URETHROCYSTOGRAM  12/20/2011       CURRENT MEDICATIONS:     Current Outpatient Medications   Medication Sig Dispense Refill   • ascorbic acid (VITAMIN C) 500 MG tablet Take 500 mg by mouth daily     • Cranberry 1000 MG CAPS Use     • fluticasone (FLONASE) 50 mcg/act nasal spray 2 sprays into each nostril daily     • Omega-3 Fatty Acids (OMEGA 3 PO) Take by mouth       No current facility-administered medications for this visit  ALLERGIES:     Allergies   Allergen Reactions   • Medical Tape Other (See Comments)     redness   • Pollen Extract Sneezing     SCM Entered Allergen= Season Changes; SCM description= Nasal congestion         SOCIAL HISTORY:     Social History     Socioeconomic History   • Marital status: Unknown     Spouse name: None   • Number of children: None   • Years of education: None   • Highest education level: None   Occupational History   • None   Tobacco Use   • Smoking status: Never   • Smokeless tobacco: Never   Vaping Use   • Vaping Use: Never used   Substance and Sexual Activity   • Alcohol use: Never   • Drug use: Never   • Sexual activity: None   Other Topics Concern   • None   Social History Narrative   • None     Social Determinants of Health     Financial Resource Strain: Not on file   Food Insecurity: Not on file   Transportation Needs: Not on file   Physical Activity: Not on file   Stress: Not on file   Social Connections: Not on file   Intimate Partner Violence: Not on file   Housing Stability: Not on file "      SOCIAL HISTORY:   History reviewed  No pertinent family history  REVIEW OF SYSTEMS:     Review of Systems   Constitutional: Negative  Respiratory: Negative  Cardiovascular: Negative  Gastrointestinal: Negative  Genitourinary: Negative  Musculoskeletal: Negative  Skin: Negative  Psychiatric/Behavioral: Negative  PHYSICAL EXAM:     /70 (BP Location: Left arm, Patient Position: Sitting, Cuff Size: Adult)   Pulse 68   Wt 62 6 kg (138 lb)   BMI 22 27 kg/m²     General:  Healthy appearing male in no acute distress  They have a normal affect  There is not appear to be any gross neurologic defects or abnormalities  HEENT:  Normocephalic, atraumatic  Neck is supple without any palpable lymphadenopathy  Cardiovascular:  Patient has normal palpable distal radial pulses  There is no significant peripheral edema  No JVD is noted  Respiratory:  Patient has unlabored respirations  There is no audible wheeze or rhonchi  Abdomen:  Abdomen is with healed surgical scars  Right lower quadrant pinpoint stoma for his Mitrofanoff, umbilical stoma for his ACE  Abdomen is soft and nontender  There is no tympany  Inguinal and umbilical hernia are not appreciated  Musculoskeletal:  Patient does not have significant CVA tenderness in the  flank with palpation or percussion  They full range of motion in all 4 extremities  Strength in all 4 extremities appears congruent  Patient is able to ambulate without assistance or difficulty  Dermatologic:  Patient has no skin abnormalities or rashes  LABS:     CBC: No results found for: \"HCT\", \"HGB\", \"MCV\", \"PLT\", \"WBC\"    BMP:   Lab Results   Component Value Date    BUN 19 05/05/2023    CALCIUM 9 0 05/05/2023     05/05/2023    CO2 26 05/05/2023    CREATININE 1 05 05/05/2023    K 3 8 05/05/2023     Urine Culture 80,000-89,000 cfu/ml Escherichia coli Abnormal     This organism has been edited   The previous result was Gram Negative " [facility] Enter[facility] Like on 2023 at 1120 EDT  Susceptibility     Escherichia coli     JOLYNN     Ampicillin ($$) <=8 00 ug/ml Susceptible     Aztreonam ($$$)  <=4 ug/ml Susceptible     Cefazolin ($) <=2 00 ug/ml Susceptible     Ciprofloxacin ($)  <=0 25 ug/ml Susceptible     Gentamicin ($$) <=2 ug/ml Susceptible     Levofloxacin ($) <=0 50 ug/ml Susceptible     Nitrofurantoin <=32 ug/ml Susceptible     Tetracycline <=4 ug/ml Susceptible     Tobramycin ($) <=2 ug/ml Susceptible     Trimethoprim + Sulfamethoxazole ($$$) <=0  5 u  Susceptible     ZID Performed Yes                           IMAGIN/26/23  RENAL ULTRASOUND     INDICATION:   K59 2: Neurogenic bowel, not elsewhere classified  N31 9: Neuromuscular dysfunction of bladder, unspecified      COMPARISON: 2022     TECHNIQUE:   Ultrasound of the retroperitoneum was performed with a curvilinear transducer utilizing volumetric sweeps and still imaging techniques      FINDINGS:     KIDNEYS:  Left kidney is smaller than the right  Right kidney:  10 4 x 4 2 x 5 5 cm  Volume 126 3 mL  Left kidney:  8 5 x 4 3 x 3 9 cm  Volume 75 8 mL     Right kidney  Normal echogenicity and contour  No mass is identified  No hydronephrosis  No shadowing calculi  No perinephric fluid collections      Left kidney  Normal echogenicity and contour  No mass is identified  Left upper pole renal scarring  No hydronephrosis  No shadowing calculi  No perinephric fluid collections      URETERS:  Nonvisualized      BLADDER:  Diffuse bladder debris, correlate with urinalysis for cystitis  Bilateral ureteral jets detected            IMPRESSION:     Bladder debris, correlate with urinalysis for cystitis  ASSESSMENT:     25 y o  male with longstanding neurogenic bladder, complex urologic repair    PLAN:     Fortunately, the patient's renal bladder ultrasound and BMP are stable    However, the patient has begun to have more frequent urinary tract infections which again I have counseled the patient and his family are likely related to incomplete catheterizations after voids  I discussed with the patient and his family that at least before work and after work, the patient should be monitored to perform a complete emptying cycle with catheter placement  Long-term if the patient were to have recurrent infections leading to hospitalizations that were unmanageable, increased hydronephrosis or renal dysfunction, discussion of passive reconstruction could be entertained  At this point time, I do not see a role for cystoscopy or urodynamic testing unless the patient again begins to have more difficulty  Continue yearly follow-up

## 2023-08-12 ENCOUNTER — OFFICE VISIT (OUTPATIENT)
Dept: URGENT CARE | Facility: MEDICAL CENTER | Age: 23
End: 2023-08-12
Payer: COMMERCIAL

## 2023-08-12 VITALS
OXYGEN SATURATION: 99 % | HEART RATE: 60 BPM | RESPIRATION RATE: 20 BRPM | WEIGHT: 138 LBS | TEMPERATURE: 97.9 F | SYSTOLIC BLOOD PRESSURE: 131 MMHG | BODY MASS INDEX: 22.27 KG/M2 | DIASTOLIC BLOOD PRESSURE: 82 MMHG

## 2023-08-12 DIAGNOSIS — M60.271 FOREIGN BODY GRANULOMA OF SOFT TISSUE OF RIGHT FOOT: Primary | ICD-10-CM

## 2023-08-12 PROCEDURE — 99213 OFFICE O/P EST LOW 20 MIN: CPT | Performed by: FAMILY MEDICINE

## 2023-08-12 NOTE — PATIENT INSTRUCTIONS
Under aseptic technique I was able was seen to be foreign body dirt particles but no true    I. There is minimal bleeding which is controlled. I applied bacitracin ointment followed by sterile Band-Aid. Patient was given a tetanus booster. Father was advised to change dressing daily until it forms a scab observing for any signs of wound infection. Soft Tissue Foreign Body   AMBULATORY CARE:   A soft tissue foreign body  is an object that is stuck under your skin. Examples of foreign bodies include wood splinters, thorns, slivers of metal or glass, and gravel. Common signs and symptoms:   A hard lump under your skin    An open wound    Pain when you touch the injured area    Redness and swelling    Bruising or bleeding    Seek care immediately if:   Blood soaks through your bandage. Your stitches come apart. You see red streaks on the skin near your wound. You have bleeding that does not stop after 10 minutes of holding firm, direct pressure over the wound. Call your doctor if:   You have a fever. Your wound is red, swollen, and draining pus. Your symptoms, such as pain, do not get better or get worse. You have questions or concerns about your condition or care. Treatment for a soft tissue foreign body:  A foreign body may dissolve or come out of your skin without treatment. It may take weeks or months for this to happen. You may need a tetanus shot to prevent infection. Tell your provider if you have had the tetanus vaccine or a tetanus booster within the last 5 years. Your provider may numb the area and make a small incision. He or she will use tools to help remove the foreign body. He or she may flush your wound to prevent infection. You may need surgery if the foreign body cannot be found or removed with a small incision. You may  need any of the following:  Antibiotics  help prevent a bacterial infection. Acetaminophen  decreases pain and fever.  It is available without a doctor's order. Ask how much to take and how often to take it. Follow directions. Acetaminophen can cause liver damage if not taken correctly. NSAIDs , such as ibuprofen, help decrease swelling, pain, and fever. This medicine is available with or without a doctor's order. NSAIDs can cause stomach bleeding or kidney problems in certain people. If you take blood thinner medicine, always ask your healthcare provider if NSAIDs are safe for you. Always read the medicine label and follow directions. Prescription pain medicine  may be given. Ask your healthcare provider how to take this medicine safely. Some prescription pain medicines contain acetaminophen. Do not take other medicines that contain acetaminophen without talking to your healthcare provider. Too much acetaminophen may cause liver damage. Prescription pain medicine may cause constipation. Ask your healthcare provider how to prevent or treat constipation. Take your medicine as directed. Contact your healthcare provider if you think your medicine is not helping or if you have side effects. Tell your provider if you are allergic to any medicine. Keep a list of the medicines, vitamins, and herbs you take. Include the amounts, and when and why you take them. Bring the list or the pill bottles to follow-up visits. Carry your medicine list with you in case of an emergency. Elevate  the injured area above the level of your heart as often as you can. This will help decrease swelling and pain. Prop the injured area on pillows or blankets to keep it elevated comfortably. Apply ice  on your wound for 15 to 20 minutes every hour or as directed. Use an ice pack, or put crushed ice in a plastic bag. Cover it with a towel before you apply it to your skin. Ice helps prevent tissue damage and decreases swelling and pain. Care for your wound as directed: Your skin may feel stretched and sore after the foreign body is removed.  This is normal and should get better within a few days. Keep your wound clean and dry. Do not get your wound wet. Do not change the bandage for 48 hours or as directed. You can change your bandages before 48 hours if they get wet or dirty. If your wound is packed, remove and change the packing as directed. Cover the area with a bandage as directed. Apply firm, steady pressure for 5 to 10 minutes if your wound bleeds. Use a clean gauze or towel to apply pressure. Bathing:  Ask your provider when your wound can get wet. When he or she says it is okay, carefully wash around the wound with soap and water. Let soap and water run over your wound. Do not scrub your wound. Dry the area and put on new, clean bandages as directed. Follow up with your doctor as directed: You may need to return in 48 hours to have your wound checked for infection. You may need x-ray, ultrasound, or CT pictures to make sure all of the foreign body has been removed. Write down your questions so you remember to ask them during your visits. © Copyright [employer] 2022 Information is for End User's use only and may not be sold, redistributed or otherwise used for commercial purposes. The above information is an  only. It is not intended as medical advice for individual conditions or treatments. Talk to your doctor, nurse or pharmacist before following any medical regimen to see if it is safe and effective for you.

## 2023-08-12 NOTE — PROGRESS NOTES
[facility]        NAME: Traci Corona is a 25 y.o. male  : 2000    MRN: [id number]  DATE: 2023  TIME: 1:16 PM    Assessment and Plan   Foreign body granuloma of soft tissue of right foot [M60.271]  1. Foreign body granuloma of soft tissue of right foot  Tdap Vaccine greater than or equal to 6yo            Patient Instructions       Follow up with PCP in 3-5 days. Proceed to  ER if symptoms worsen. Chief Complaint     Chief Complaint   Patient presents with   • FB in foot     Patient has part of a splinter left in his R foot. History of Present Illness       26-year-old male here today with complaint of foreign body in the heel of his right foot. He apparently was walking barefoot and stepped on a graphite pencil which into the heel. He was able to remove a portion of it however this some portion that remains. Denies any fever or purulent discharge. Cannot recall the last time he had a tetanus shot. Review of Systems   Review of Systems   Skin: Positive for wound. Current Medications       Current Outpatient Medications:   •  ascorbic acid (VITAMIN C) 500 MG tablet, Take 500 mg by mouth daily, Disp: , Rfl:   •  Cranberry 1000 MG CAPS, Use, Disp: , Rfl:   •  fluticasone (FLONASE) 50 mcg/act nasal spray, 2 sprays into each nostril daily, Disp: , Rfl:   •  Omega-3 Fatty Acids (OMEGA 3 PO), Take by mouth, Disp: , Rfl:     Current Allergies     Allergies as of 2023 - Reviewed 2023   Allergen Reaction Noted   • Medical tape Other (See Comments) 2019   • Pollen extract Sneezing 02/15/2006            The following portions of the patient's history were reviewed and updated as appropriate: allergies, current medications, past family history, past medical history, past social history, past surgical history and problem list.     History reviewed. No pertinent past medical history.     Past Surgical History:   Procedure Laterality Date   • FL VCUG VOIDING URETHROCYSTOGRAM  7/22/2019   • FL VCUG VOIDING URETHROCYSTOGRAM  3/27/2017   • FL VCUG VOIDING URETHROCYSTOGRAM  10/10/2016   • FL VCUG VOIDING URETHROCYSTOGRAM  6/30/2014   • FL VCUG VOIDING URETHROCYSTOGRAM  9/30/2013   • FL VCUG VOIDING URETHROCYSTOGRAM  1/14/2013   • FL VCUG VOIDING URETHROCYSTOGRAM  12/20/2011       No family history on file. Medications have been verified. Objective   /82   Pulse 60   Temp 97.9 °F (36.6 °C)   Resp 20   Wt 62.6 kg (138 lb)   SpO2 99%   BMI 22.27 kg/m²   No LMP for male patient. Physical Exam     Physical Exam  Skin:     Comments: Right foot: Right heel reveals what seems to be foreign body measuring about 2 mm in length within a callus. Not actively bleeding. No evidence of induration or purulent discharge observed. Slightly tender to touch. Universal Protocol:  Consent: Verbal consent obtained. Consent given by: patient  Patient understanding: patient states understanding of the procedure being performed    Foreign body removal    Date/Time: 8/12/2023 11:10 AM    Performed by: Toribio Beach MD  Authorized by: Toribio Beach MD  Body area: skin  General location: lower extremity  Location details: right foot    Anesthesia:  Local Anesthetic: lidocaine spray    Sedation:  Patient sedated: no  Complexity: simple  1 objects recovered.   Objects recovered: 1  Post-procedure assessment: foreign body removed  Patient tolerance: patient tolerated the procedure well with no immediate complications

## 2024-05-02 ENCOUNTER — HOSPITAL ENCOUNTER (OUTPATIENT)
Dept: ULTRASOUND IMAGING | Facility: MEDICAL CENTER | Age: 24
Discharge: HOME | End: 2024-05-02
Payer: COMMERCIAL

## 2024-05-02 DIAGNOSIS — N31.9 NEUROGENIC BLADDER: ICD-10-CM

## 2024-05-02 PROCEDURE — 76775 US EXAM ABDO BACK WALL LIM: CPT

## 2024-05-14 ENCOUNTER — TELEPHONE (OUTPATIENT)
Dept: UROLOGY | Facility: CLINIC | Age: 24
End: 2024-05-14

## 2024-05-14 NOTE — TELEPHONE ENCOUNTER
----- Message from Nacho Ordaz MD sent at 5/13/2024  8:38 AM EDT -----  MELLY followup within the next year, neurogenic bladder patient. US looks fine

## 2024-09-10 ENCOUNTER — TELEPHONE (OUTPATIENT)
Age: 24
End: 2024-09-10

## 2025-02-12 ENCOUNTER — APPOINTMENT (OUTPATIENT)
Dept: LAB | Facility: MEDICAL CENTER | Age: 25
End: 2025-02-12
Payer: COMMERCIAL

## 2025-02-12 ENCOUNTER — NURSE TRIAGE (OUTPATIENT)
Age: 25
End: 2025-02-12

## 2025-02-12 DIAGNOSIS — R39.9 UTI SYMPTOMS: Primary | ICD-10-CM

## 2025-02-12 LAB
BACTERIA UR QL AUTO: ABNORMAL /HPF
BILIRUB UR QL STRIP: NEGATIVE
CLARITY UR: ABNORMAL
COLOR UR: ABNORMAL
GLUCOSE UR STRIP-MCNC: NEGATIVE MG/DL
HGB UR QL STRIP.AUTO: NEGATIVE
KETONES UR STRIP-MCNC: NEGATIVE MG/DL
LEUKOCYTE ESTERASE UR QL STRIP: ABNORMAL
NITRITE UR QL STRIP: POSITIVE
NON-SQ EPI CELLS URNS QL MICRO: ABNORMAL /HPF
PH UR STRIP.AUTO: 7 [PH]
PROT UR STRIP-MCNC: ABNORMAL MG/DL
RBC #/AREA URNS AUTO: ABNORMAL /HPF
SP GR UR STRIP.AUTO: 1.02 (ref 1–1.03)
UROBILINOGEN UR STRIP-ACNC: <2 MG/DL
WBC #/AREA URNS AUTO: ABNORMAL /HPF

## 2025-02-12 PROCEDURE — 81001 URINALYSIS AUTO W/SCOPE: CPT

## 2025-02-12 PROCEDURE — 87077 CULTURE AEROBIC IDENTIFY: CPT

## 2025-02-12 PROCEDURE — 87086 URINE CULTURE/COLONY COUNT: CPT

## 2025-02-12 PROCEDURE — 87186 SC STD MICRODIL/AGAR DIL: CPT

## 2025-02-12 NOTE — TELEPHONE ENCOUNTER
"  Answer Assessment - Initial Assessment Questions  1. REASON FOR CALL: \"What is the main reason for your call?\" or \"How can I best help you?\"          Patient's mother called in to report patient is having leakage at bladder opening. Patient had a Mitrofanoff procedure in the past. States when he has leakage, usually indicates an infection. Denies any other symptoms at this time. Placed urine orders to rule out infection.    Protocols used: Information Only Call - No Triage-Adult-[place]    "

## 2025-02-14 LAB
BACTERIA UR CULT: ABNORMAL
BACTERIA UR CULT: ABNORMAL

## 2025-02-14 RX ORDER — SULFAMETHOXAZOLE AND TRIMETHOPRIM 800; 160 MG/1; MG/1
1 TABLET ORAL EVERY 12 HOURS SCHEDULED
Qty: 14 TABLET | Refills: 0 | Status: SHIPPED | OUTPATIENT
Start: 2025-02-14 | End: 2025-02-21

## 2025-02-14 NOTE — TELEPHONE ENCOUNTER
Called and left a message for patients mother Treasure advising that patients urine culture came back positive for an infection and a script for Bactrim was sent to [facility] for patient to take twice a day for 7 days.

## 2025-02-14 NOTE — TELEPHONE ENCOUNTER
Urine culture positive for infection. Plan to treat with bactrim for 7 days. Abx sent. Please notify patient.

## 2025-03-12 ENCOUNTER — TELEPHONE (OUTPATIENT)
Age: 25
End: 2025-03-12

## 2025-03-12 NOTE — TELEPHONE ENCOUNTER
Reached out to pt in regards to [facility] wait list maintenance & to gather pt preferences.     If no longer interested, please remove from wait list. Outside resources can be offered.     No available appts at this time.     First attempt

## 2025-03-13 ENCOUNTER — TELEPHONE (OUTPATIENT)
Age: 25
End: 2025-03-13

## 2025-03-13 NOTE — TELEPHONE ENCOUNTER
Patient's mother returned call regarding wait list and would like her son to remain on wait list until there is an available appointment.

## 2025-03-13 NOTE — PROGRESS NOTES
"Ambulatory Visit  Name: Roger Pope      : 2000      MRN: [id number]  Encounter Provider: Cuh Somers MD  Encounter Date: 3/19/2025   Encounter department: [facility] [place] [facility]    Assessment & Plan  Appendico-vesicostomy present (HCC)  Neurogenic bladder s/p appendicovesicostomy, deflux, and bladder neck repair with sling.     Overall stable renal function. Occasional UTIs. No evidence of bladder stones last year.     He knows to keep his TID cathing regimen to prevent infections.       - Renal and bladder ultrasound now. Will call if abnormal results  - Renal and bladder ultrasound, BPM. Follow up with AP team in 1 year.   Orders:    US kidney and bladder; Future    Basic metabolic panel; Future    US kidney and bladder with pvr; Future      History of Present Illness     Roger Pope is a 24 y.o. male who presents for follow up of neurogenic bladder.     Previously seen by Dr. Ordaz in :     \"Chiari syndrome, prior subdural hemorrhage, neurogenic bladder.  Patient underwent a history of split appendiceal catheterizable channel or Mitrofanoff and then ACE Dominguez procedure (), patient had a reoperative bladder neck repair and sling in 2017 and a Deflux injection in 2019.  Patient has had recurrent leakage through his urethra and intermittent infections.       Last visit at the [facility] where he previously received care was in 2019.  Last imaging around that time.       Presented in  as a transfer of care.  His family reports that it was recommended to catheterize 4 times a day with a 12 [place] catheter.  He is often currently catheterizing 3 times a day.  He reports he is not having any difficulty.  He has a good clean intermittent catheterization routine with hand cleaning.  He utilized his ACE daily with 2 cups of sterile saline with occasional glycerin for bowel mgmt.      Since initial visit in , one infection 2022 with e.coli.     Since " "his last visit, the patient has continued to have difficulty with catheterization regimen.  He was able to provide me with a voiding diary with postoperative catheter volumes.  This was performed during a weekend when the patient was not working.  Both he and his mother report that he has not been diligent during days where he is not performing the voiding diary and is rare to use catheters.  Catheter diary indicates that most of the time and the patient is performing 4 times daily or 3 times daily postvoid catheters, he has at least 50% of the voided volume remaining in his bladder.  Of note, the patient has had 2 urinary infections in recent history, one in February and 1 in May.\"    Plan at that time was for more diligent and frequent catheterization.     He has had a recent infection. His infections are typically experienced as increased leakage from his catheterizable channel. He admits that he was not cathing at this time and has resumed TID cathing regimen.     Last renal ultrasound in 2024 was stable.     He is catheterizing without difficulty.    History obtained from : patient        Objective     There were no vitals taken for this visit.  Physical Exam  There were no vitals filed for this visit.   General: Well appearing, no acute distress   HEENT: normocephalic, atraumatic  Eyes: extraocular movements intact  Cardiovascular: normal rate   Respiratory: no respiratory distress, effort normal   Abdominal: Non-distended, non-tender.  : Deferred  MSK: Grossly normal range of motion   Neurological: No gross focal deficits  Behavioral: Normal mood and affect     Results  No results found for: \"PSA\"  Lab Results   Component Value Date    CALCIUM 9.6 10/04/2024    K 3.9 10/04/2024    CO2 27 10/04/2024     10/04/2024    BUN 15 10/04/2024    CREATININE 0.96 10/04/2024     No results found for: \"WBC\", \"HGB\", \"HCT\", \"MCV\", \"PLT\"    Imaging  I have personally reviewed pertinent films in PACS.    No results " found for this or any previous visit.      Office Urine Dip  No results found for this or any previous visit (from the past hour).]    Administrative Statements

## 2025-03-19 ENCOUNTER — OFFICE VISIT (OUTPATIENT)
Dept: UROLOGY | Facility: CLINIC | Age: 25
End: 2025-03-19
Payer: COMMERCIAL

## 2025-03-19 VITALS
DIASTOLIC BLOOD PRESSURE: 76 MMHG | WEIGHT: 143 LBS | HEART RATE: 63 BPM | OXYGEN SATURATION: 98 % | BODY MASS INDEX: 22.44 KG/M2 | SYSTOLIC BLOOD PRESSURE: 110 MMHG | HEIGHT: 67 IN

## 2025-03-19 DIAGNOSIS — Z93.52 APPENDICO-VESICOSTOMY PRESENT (HCC): Primary | ICD-10-CM

## 2025-03-19 PROCEDURE — 99213 OFFICE O/P EST LOW 20 MIN: CPT

## 2025-03-19 NOTE — ASSESSMENT & PLAN NOTE
Neurogenic bladder s/p appendicovesicostomy, deflux, and bladder neck repair with sling.     Overall stable renal function. Occasional UTIs. No evidence of bladder stones last year.     He knows to keep his TID cathing regimen to prevent infections.       - Renal and bladder ultrasound now. Will call if abnormal results  - Renal and bladder ultrasound, BPM. Follow up with AP team in 1 year.   Orders:    US kidney and bladder; Future    Basic metabolic panel; Future    US kidney and bladder with pvr; Future

## 2025-03-28 ENCOUNTER — HOSPITAL ENCOUNTER (OUTPATIENT)
Dept: ULTRASOUND IMAGING | Facility: MEDICAL CENTER | Age: 25
Discharge: HOME | End: 2025-03-28
Payer: COMMERCIAL

## 2025-03-28 DIAGNOSIS — Z93.52 APPENDICO-VESICOSTOMY PRESENT (HCC): ICD-10-CM

## 2025-03-28 PROCEDURE — 76770 US EXAM ABDO BACK WALL COMP: CPT

## 2025-04-01 ENCOUNTER — RESULTS FOLLOW-UP (OUTPATIENT)
Dept: OTHER | Facility: HOSPITAL | Age: 25
End: 2025-04-01

## 2025-07-25 ENCOUNTER — TELEPHONE (OUTPATIENT)
Age: 25
End: 2025-07-25

## 2025-07-25 NOTE — TELEPHONE ENCOUNTER
Faxed last office visit note to [facility] supplies per request.    Fax#[phone]

## 2025-08-01 NOTE — TELEPHONE ENCOUNTER
Laura from [facility] called stating they did not receive fax. Confirmed correct fax number a refaxed last OV note to [phone]

## 2025-08-21 ENCOUNTER — NURSE TRIAGE (OUTPATIENT)
Age: 25
End: 2025-08-21

## 2025-08-21 DIAGNOSIS — R39.9 UTI SYMPTOMS: Primary | ICD-10-CM

## 2025-08-21 RX ORDER — SULFAMETHOXAZOLE AND TRIMETHOPRIM 800; 160 MG/1; MG/1
1 TABLET ORAL EVERY 12 HOURS SCHEDULED
Qty: 14 TABLET | Refills: 0 | Status: SHIPPED | OUTPATIENT
Start: 2025-08-21 | End: 2025-08-28